# Patient Record
(demographics unavailable — no encounter records)

---

## 2024-10-15 NOTE — HEALTH RISK ASSESSMENT
[Good] : ~his/her~  mood as  good [No] : In the past 12 months have you used drugs other than those required for medical reasons? No [No falls in past year] : Patient reported no falls in the past year [0] : 2) Feeling down, depressed, or hopeless: Not at all (0) [Never] : Never [NO] : No [Patient reported colonoscopy was normal] : Patient reported colonoscopy was normal [HIV test declined] : HIV test declined [Hepatitis C test offered] : Hepatitis C test offered [None] : None [Feels Safe at Home] : Feels safe at home [Fully functional (bathing, dressing, toileting, transferring, walking, feeding)] : Fully functional (bathing, dressing, toileting, transferring, walking, feeding) [Fully functional (using the telephone, shopping, preparing meals, housekeeping, doing laundry, using] : Fully functional and needs no help or supervision to perform IADLs (using the telephone, shopping, preparing meals, housekeeping, doing laundry, using transportation, managing medications and managing finances) [Smoke Detector] : smoke detector [Carbon Monoxide Detector] : carbon monoxide detector [Seat Belt] :  uses seat belt [Sunscreen] : uses sunscreen [With Patient/Caregiver] : , with patient/caregiver [FreeTextEntry1] : Check up\par   [de-identified] : ORTHO/CARD [[id number]] : 0 [Change in mental status noted] : No change in mental status noted [Reports changes in hearing] : Reports no changes in hearing [Reports changes in vision] : Reports no changes in vision [Reports changes in dental health] : Reports no changes in dental health [ColonoscopyDate] : 12/15 [AdvancecareDate] : 10/24

## 2024-10-15 NOTE — HISTORY OF PRESENT ILLNESS
[de-identified] : 88 year old male patient with history of stable Essential Hypertension, Paroxysmal Atrial Fibrillation, CAD, Hypercholesterolemia, Anxiety, Depression, Vitamin D Deficiency, TIA, history as stated, presented for an annual preventative examination.

## 2024-10-15 NOTE — ASSESSMENT
[FreeTextEntry1] : 88 year old male found to have stable Essential Hypertension, Paroxysmal Atrial Fibrillation, CAD, Hypercholesterolemia, Anxiety, Depression, Vitamin D Deficiency, TIA, with the current prescription regimen as recommended, diet and lifestyle modifications, as counseled. Prior results reviewed, interpreted and discussed with the patient during today's examination, as appropriate. Follow up, treatment plan and tests, as ordered.   Lab results from cardiologist as requested and counseled during today's examination.  Patient is refusing all immunizations, in spite of counseling risks and benefits.

## 2024-10-15 NOTE — HEALTH RISK ASSESSMENT
[Good] : ~his/her~  mood as  good [No] : In the past 12 months have you used drugs other than those required for medical reasons? No [No falls in past year] : Patient reported no falls in the past year [0] : 2) Feeling down, depressed, or hopeless: Not at all (0) [Never] : Never [NO] : No [Patient reported colonoscopy was normal] : Patient reported colonoscopy was normal [HIV test declined] : HIV test declined [Hepatitis C test offered] : Hepatitis C test offered [None] : None [Feels Safe at Home] : Feels safe at home [Fully functional (bathing, dressing, toileting, transferring, walking, feeding)] : Fully functional (bathing, dressing, toileting, transferring, walking, feeding) [Fully functional (using the telephone, shopping, preparing meals, housekeeping, doing laundry, using] : Fully functional and needs no help or supervision to perform IADLs (using the telephone, shopping, preparing meals, housekeeping, doing laundry, using transportation, managing medications and managing finances) [Smoke Detector] : smoke detector [Carbon Monoxide Detector] : carbon monoxide detector [Seat Belt] :  uses seat belt [Sunscreen] : uses sunscreen [With Patient/Caregiver] : , with patient/caregiver [FreeTextEntry1] : Check up\par   [de-identified] : ORTHO/CARD [[id number]] : 0 [Change in mental status noted] : No change in mental status noted [Reports changes in hearing] : Reports no changes in hearing [Reports changes in vision] : Reports no changes in vision [Reports changes in dental health] : Reports no changes in dental health [ColonoscopyDate] : 12/15 [AdvancecareDate] : 10/24

## 2024-10-15 NOTE — HISTORY OF PRESENT ILLNESS
[de-identified] : 88 year old male patient with history of stable Essential Hypertension, Paroxysmal Atrial Fibrillation, CAD, Hypercholesterolemia, Anxiety, Depression, Vitamin D Deficiency, TIA, history as stated, presented for an annual preventative examination.

## 2025-04-15 NOTE — HISTORY OF PRESENT ILLNESS
[de-identified] : 88 year old  male patient with history of stable Essential Hypertension, Paroxysmal Atrial Fibrillation, CAD, Hypercholesterolemia, Anxiety, Depression, Vitamin D Deficiency, TIA, Elevated Hemoglobin A1c, history as stated, presented for follow up examination. Patient is compliant with all medications. ROS as stated.

## 2025-04-15 NOTE — HISTORY OF PRESENT ILLNESS
[de-identified] : 88 year old  male patient with history of stable Essential Hypertension, Paroxysmal Atrial Fibrillation, CAD, Hypercholesterolemia, Anxiety, Depression, Vitamin D Deficiency, TIA, Elevated Hemoglobin A1c, history as stated, presented for follow up examination. Patient is compliant with all medications. ROS as stated.

## 2025-04-15 NOTE — ASSESSMENT
[FreeTextEntry1] : 88 year old male found to have stable Essential Hypertension, Paroxysmal Atrial Fibrillation, CAD, Hypercholesterolemia, Anxiety, Depression, Vitamin D Deficiency, TIA, Elevated Hemoglobin A1c, with the current prescription regimen as recommended, diet and lifestyle modifications, as counseled. Prior results reviewed, interpreted and discussed with the patient during today's examination, as appropriate. Follow up, treatment plan and tests, as ordered.   Total time spent:: 30 minutes Including: Preparation prior to visit - Reviewing prior record, results of tests and Consultation Reports as applicable Conducting an appropriate H & P during today's encounter Appropriate orders for tests, medications and procedures, as applicable Counseling patient Note completion